# Patient Record
Sex: FEMALE | Race: WHITE
[De-identification: names, ages, dates, MRNs, and addresses within clinical notes are randomized per-mention and may not be internally consistent; named-entity substitution may affect disease eponyms.]

---

## 2017-06-28 ENCOUNTER — HOSPITAL ENCOUNTER (OUTPATIENT)
Dept: HOSPITAL 84 - D.CT | Age: 67
Discharge: HOME | End: 2017-06-28
Attending: FAMILY MEDICINE
Payer: MEDICARE

## 2017-06-28 DIAGNOSIS — R55: Primary | ICD-10-CM

## 2018-01-17 ENCOUNTER — HOSPITAL ENCOUNTER (INPATIENT)
Dept: HOSPITAL 84 - D.MS | Age: 68
LOS: 3 days | Discharge: HOME HEALTH | DRG: 392 | End: 2018-01-20
Attending: FAMILY MEDICINE | Admitting: FAMILY MEDICINE
Payer: MEDICARE

## 2018-01-17 VITALS
BODY MASS INDEX: 23.19 KG/M2 | BODY MASS INDEX: 23.19 KG/M2 | BODY MASS INDEX: 23.19 KG/M2 | HEIGHT: 62 IN | BODY MASS INDEX: 23.19 KG/M2 | WEIGHT: 126 LBS | WEIGHT: 126 LBS | HEIGHT: 62 IN

## 2018-01-17 VITALS — SYSTOLIC BLOOD PRESSURE: 124 MMHG | DIASTOLIC BLOOD PRESSURE: 58 MMHG

## 2018-01-17 DIAGNOSIS — K59.09: ICD-10-CM

## 2018-01-17 DIAGNOSIS — I10: ICD-10-CM

## 2018-01-17 DIAGNOSIS — I48.91: ICD-10-CM

## 2018-01-17 DIAGNOSIS — J45.909: ICD-10-CM

## 2018-01-17 DIAGNOSIS — K57.92: Primary | ICD-10-CM

## 2018-01-17 LAB
ALBUMIN SERPL-MCNC: 3.4 G/DL (ref 3.4–5)
ALP SERPL-CCNC: 101 U/L (ref 46–116)
ALT SERPL-CCNC: 22 U/L (ref 10–68)
AMYLASE SERPL-CCNC: 56 U/L (ref 25–115)
ANION GAP SERPL CALC-SCNC: 14.3 MMOL/L (ref 8–16)
BASOPHILS NFR BLD AUTO: 0.4 % (ref 0–2)
BILIRUB SERPL-MCNC: 0.3 MG/DL (ref 0.2–1.3)
BUN SERPL-MCNC: 14 MG/DL (ref 7–18)
CALCIUM SERPL-MCNC: 9.1 MG/DL (ref 8.5–10.1)
CHLORIDE SERPL-SCNC: 101 MMOL/L (ref 98–107)
CO2 SERPL-SCNC: 27.4 MMOL/L (ref 21–32)
CREAT SERPL-MCNC: 1 MG/DL (ref 0.6–1.3)
EOSINOPHIL NFR BLD: 1.2 % (ref 0–7)
ERYTHROCYTE [DISTWIDTH] IN BLOOD BY AUTOMATED COUNT: 12.7 % (ref 11.5–14.5)
GLOBULIN SER-MCNC: 3.3 G/L
GLUCOSE SERPL-MCNC: 107 MG/DL (ref 74–106)
HCT VFR BLD CALC: 37.7 % (ref 36–48)
HGB BLD-MCNC: 12.7 G/DL (ref 12–16)
IMM GRANULOCYTES NFR BLD: 0.1 % (ref 0–5)
LIPASE SERPL-CCNC: 192 U/L (ref 73–393)
LYMPHOCYTES NFR BLD AUTO: 27.5 % (ref 15–50)
MCH RBC QN AUTO: 31.4 PG (ref 26–34)
MCHC RBC AUTO-ENTMCNC: 33.7 G/DL (ref 31–37)
MCV RBC: 93.3 FL (ref 80–100)
MONOCYTES NFR BLD: 8.3 % (ref 2–11)
NEUTROPHILS NFR BLD AUTO: 62.5 % (ref 40–80)
OSMOLALITY SERPL CALC.SUM OF ELEC: 278 MOSM/KG (ref 275–300)
PLATELET # BLD: 309 10X3/UL (ref 130–400)
PMV BLD AUTO: 10.7 FL (ref 7.4–10.4)
POTASSIUM SERPL-SCNC: 3.7 MMOL/L (ref 3.5–5.1)
PROT SERPL-MCNC: 6.7 G/DL (ref 6.4–8.2)
RBC # BLD AUTO: 4.04 10X6/UL (ref 4–5.4)
SODIUM SERPL-SCNC: 139 MMOL/L (ref 136–145)
WBC # BLD AUTO: 7.3 10X3/UL (ref 4.8–10.8)

## 2018-01-18 VITALS — SYSTOLIC BLOOD PRESSURE: 132 MMHG | DIASTOLIC BLOOD PRESSURE: 63 MMHG

## 2018-01-18 VITALS — SYSTOLIC BLOOD PRESSURE: 122 MMHG | DIASTOLIC BLOOD PRESSURE: 62 MMHG

## 2018-01-18 VITALS — SYSTOLIC BLOOD PRESSURE: 136 MMHG | DIASTOLIC BLOOD PRESSURE: 60 MMHG

## 2018-01-18 VITALS — DIASTOLIC BLOOD PRESSURE: 74 MMHG | SYSTOLIC BLOOD PRESSURE: 137 MMHG

## 2018-01-18 VITALS — DIASTOLIC BLOOD PRESSURE: 65 MMHG | SYSTOLIC BLOOD PRESSURE: 120 MMHG

## 2018-01-18 LAB
APPEARANCE UR: CLEAR
BACTERIA #/AREA URNS HPF: (no result) /HPF
BILIRUB SERPL-MCNC: NEGATIVE MG/DL
COLOR UR: YELLOW
GLUCOSE SERPL-MCNC: NEGATIVE MG/DL
KETONES UR STRIP-MCNC: NEGATIVE MG/DL
NITRITE UR-MCNC: NEGATIVE MG/ML
PH UR STRIP: 5 [PH] (ref 5–6)
PROT UR-MCNC: NEGATIVE MG/DL
RBC #/AREA URNS HPF: (no result) /HPF (ref 0–5)
SP GR UR STRIP: 1.02 (ref 1–1.02)
SQUAMOUS #/AREA URNS HPF: (no result) /HPF (ref 0–5)
UROBILINOGEN UR-MCNC: NORMAL MG/DL
WBC #/AREA URNS HPF: (no result) /HPF (ref 0–5)

## 2018-01-19 VITALS — SYSTOLIC BLOOD PRESSURE: 105 MMHG | DIASTOLIC BLOOD PRESSURE: 72 MMHG

## 2018-01-19 VITALS — SYSTOLIC BLOOD PRESSURE: 137 MMHG | DIASTOLIC BLOOD PRESSURE: 73 MMHG

## 2018-01-19 VITALS — SYSTOLIC BLOOD PRESSURE: 137 MMHG | DIASTOLIC BLOOD PRESSURE: 67 MMHG

## 2018-01-19 VITALS — SYSTOLIC BLOOD PRESSURE: 136 MMHG | DIASTOLIC BLOOD PRESSURE: 63 MMHG

## 2018-01-19 VITALS — DIASTOLIC BLOOD PRESSURE: 74 MMHG | SYSTOLIC BLOOD PRESSURE: 143 MMHG

## 2018-01-20 VITALS — SYSTOLIC BLOOD PRESSURE: 116 MMHG | DIASTOLIC BLOOD PRESSURE: 76 MMHG

## 2018-01-20 VITALS — SYSTOLIC BLOOD PRESSURE: 112 MMHG | DIASTOLIC BLOOD PRESSURE: 58 MMHG

## 2018-01-20 LAB
ANION GAP SERPL CALC-SCNC: 10.9 MMOL/L (ref 8–16)
BASOPHILS NFR BLD AUTO: 0.4 % (ref 0–2)
BUN SERPL-MCNC: 9 MG/DL (ref 7–18)
CALCIUM SERPL-MCNC: 9.2 MG/DL (ref 8.5–10.1)
CHLORIDE SERPL-SCNC: 107 MMOL/L (ref 98–107)
CO2 SERPL-SCNC: 27.7 MMOL/L (ref 21–32)
CREAT SERPL-MCNC: 0.8 MG/DL (ref 0.6–1.3)
EOSINOPHIL NFR BLD: 1.8 % (ref 0–7)
ERYTHROCYTE [DISTWIDTH] IN BLOOD BY AUTOMATED COUNT: 12.5 % (ref 11.5–14.5)
GLUCOSE SERPL-MCNC: 93 MG/DL (ref 74–106)
HCT VFR BLD CALC: 38.5 % (ref 36–48)
HGB BLD-MCNC: 12.7 G/DL (ref 12–16)
IMM GRANULOCYTES NFR BLD: 0.2 % (ref 0–5)
LYMPHOCYTES NFR BLD AUTO: 26.8 % (ref 15–50)
MCH RBC QN AUTO: 30.8 PG (ref 26–34)
MCHC RBC AUTO-ENTMCNC: 33 G/DL (ref 31–37)
MCV RBC: 93.2 FL (ref 80–100)
MONOCYTES NFR BLD: 8 % (ref 2–11)
NEUTROPHILS NFR BLD AUTO: 62.8 % (ref 40–80)
OSMOLALITY SERPL CALC.SUM OF ELEC: 281 MOSM/KG (ref 275–300)
PLATELET # BLD: 277 10X3/UL (ref 130–400)
PMV BLD AUTO: 10.7 FL (ref 7.4–10.4)
POTASSIUM SERPL-SCNC: 3.6 MMOL/L (ref 3.5–5.1)
RBC # BLD AUTO: 4.13 10X6/UL (ref 4–5.4)
SODIUM SERPL-SCNC: 142 MMOL/L (ref 136–145)
WBC # BLD AUTO: 5 10X3/UL (ref 4.8–10.8)

## 2018-02-20 ENCOUNTER — HOSPITAL ENCOUNTER (INPATIENT)
Dept: HOSPITAL 84 - D.SDCHOLD | Age: 68
LOS: 3 days | Discharge: HOME | DRG: 331 | End: 2018-02-23
Attending: SURGERY | Admitting: SURGERY
Payer: MEDICARE

## 2018-02-20 VITALS — SYSTOLIC BLOOD PRESSURE: 108 MMHG | DIASTOLIC BLOOD PRESSURE: 67 MMHG

## 2018-02-20 VITALS — DIASTOLIC BLOOD PRESSURE: 62 MMHG | SYSTOLIC BLOOD PRESSURE: 110 MMHG

## 2018-02-20 VITALS — SYSTOLIC BLOOD PRESSURE: 112 MMHG | DIASTOLIC BLOOD PRESSURE: 59 MMHG

## 2018-02-20 VITALS — DIASTOLIC BLOOD PRESSURE: 59 MMHG | SYSTOLIC BLOOD PRESSURE: 107 MMHG

## 2018-02-20 VITALS — BODY MASS INDEX: 22.5 KG/M2 | BODY MASS INDEX: 22.7 KG/M2

## 2018-02-20 DIAGNOSIS — K57.32: Primary | ICD-10-CM

## 2018-02-20 DIAGNOSIS — J45.909: ICD-10-CM

## 2018-02-20 DIAGNOSIS — I48.91: ICD-10-CM

## 2018-02-20 LAB
ANION GAP SERPL CALC-SCNC: 13.1 MMOL/L (ref 8–16)
BUN SERPL-MCNC: 11 MG/DL (ref 7–18)
CALCIUM SERPL-MCNC: 9.3 MG/DL (ref 8.5–10.1)
CHLORIDE SERPL-SCNC: 102 MMOL/L (ref 98–107)
CO2 SERPL-SCNC: 29.6 MMOL/L (ref 21–32)
CREAT SERPL-MCNC: 0.4 MG/DL (ref 0.6–1.3)
ERYTHROCYTE [DISTWIDTH] IN BLOOD BY AUTOMATED COUNT: 12.9 % (ref 11.5–14.5)
GLUCOSE SERPL-MCNC: 84 MG/DL (ref 74–106)
HCT VFR BLD CALC: 37.8 % (ref 36–48)
HGB BLD-MCNC: 13.1 G/DL (ref 12–16)
LYMPHOCYTES NFR BLD AUTO: 30.6 % (ref 15–50)
MCH RBC QN AUTO: 30.8 PG (ref 26–34)
MCHC RBC AUTO-ENTMCNC: 34.7 G/DL (ref 31–37)
MCV RBC: 88.9 FL (ref 80–100)
NEUTROPHILS NFR BLD AUTO: 60.6 % (ref 40–80)
OSMOLALITY SERPL CALC.SUM OF ELEC: 275 MOSM/KG (ref 275–300)
PLATELET # BLD: 291 10X3/UL (ref 130–400)
PMV BLD AUTO: 11.3 FL (ref 7.4–10.4)
POTASSIUM SERPL-SCNC: 5.7 MMOL/L (ref 3.5–5.1)
RBC # BLD AUTO: 4.25 10X6/UL (ref 4–5.4)
SODIUM SERPL-SCNC: 139 MMOL/L (ref 136–145)
WBC # BLD AUTO: 4.6 10X3/UL (ref 4.8–10.8)

## 2018-02-20 PROCEDURE — 0DBN0ZZ EXCISION OF SIGMOID COLON, OPEN APPROACH: ICD-10-PCS | Performed by: SURGERY

## 2018-02-21 VITALS — SYSTOLIC BLOOD PRESSURE: 115 MMHG | DIASTOLIC BLOOD PRESSURE: 49 MMHG

## 2018-02-21 VITALS — SYSTOLIC BLOOD PRESSURE: 106 MMHG | DIASTOLIC BLOOD PRESSURE: 47 MMHG

## 2018-02-21 VITALS — DIASTOLIC BLOOD PRESSURE: 47 MMHG | SYSTOLIC BLOOD PRESSURE: 102 MMHG

## 2018-02-21 VITALS — DIASTOLIC BLOOD PRESSURE: 50 MMHG | SYSTOLIC BLOOD PRESSURE: 96 MMHG

## 2018-02-21 VITALS — SYSTOLIC BLOOD PRESSURE: 122 MMHG | DIASTOLIC BLOOD PRESSURE: 61 MMHG

## 2018-02-21 VITALS — DIASTOLIC BLOOD PRESSURE: 58 MMHG | SYSTOLIC BLOOD PRESSURE: 114 MMHG

## 2018-02-21 LAB
ANION GAP SERPL CALC-SCNC: 14.7 MMOL/L (ref 8–16)
BASOPHILS NFR BLD AUTO: 0.1 % (ref 0–2)
BUN SERPL-MCNC: 8 MG/DL (ref 7–18)
CALCIUM SERPL-MCNC: 8 MG/DL (ref 8.5–10.1)
CHLORIDE SERPL-SCNC: 103 MMOL/L (ref 98–107)
CO2 SERPL-SCNC: 23.1 MMOL/L (ref 21–32)
CREAT SERPL-MCNC: 0.7 MG/DL (ref 0.6–1.3)
EOSINOPHIL NFR BLD: 0 % (ref 0–7)
ERYTHROCYTE [DISTWIDTH] IN BLOOD BY AUTOMATED COUNT: 12.8 % (ref 11.5–14.5)
GLUCOSE SERPL-MCNC: 98 MG/DL (ref 74–106)
HCT VFR BLD CALC: 36 % (ref 36–48)
HGB BLD-MCNC: 12 G/DL (ref 12–16)
IMM GRANULOCYTES NFR BLD: 0.3 % (ref 0–5)
LYMPHOCYTES NFR BLD AUTO: 8.7 % (ref 15–50)
MCH RBC QN AUTO: 30.7 PG (ref 26–34)
MCHC RBC AUTO-ENTMCNC: 33.3 G/DL (ref 31–37)
MCV RBC: 92.1 FL (ref 80–100)
MONOCYTES NFR BLD: 4.7 % (ref 2–11)
NEUTROPHILS NFR BLD AUTO: 86.2 % (ref 40–80)
OSMOLALITY SERPL CALC.SUM OF ELEC: 271 MOSM/KG (ref 275–300)
PLATELET # BLD: 269 10X3/UL (ref 130–400)
PMV BLD AUTO: 10.9 FL (ref 7.4–10.4)
POTASSIUM SERPL-SCNC: 3.8 MMOL/L (ref 3.5–5.1)
RBC # BLD AUTO: 3.91 10X6/UL (ref 4–5.4)
SODIUM SERPL-SCNC: 137 MMOL/L (ref 136–145)
WBC # BLD AUTO: 10.6 10X3/UL (ref 4.8–10.8)

## 2018-02-22 VITALS — SYSTOLIC BLOOD PRESSURE: 121 MMHG | DIASTOLIC BLOOD PRESSURE: 66 MMHG

## 2018-02-22 VITALS — SYSTOLIC BLOOD PRESSURE: 156 MMHG | DIASTOLIC BLOOD PRESSURE: 75 MMHG

## 2018-02-22 VITALS — SYSTOLIC BLOOD PRESSURE: 126 MMHG | DIASTOLIC BLOOD PRESSURE: 64 MMHG

## 2018-02-22 VITALS — DIASTOLIC BLOOD PRESSURE: 50 MMHG | SYSTOLIC BLOOD PRESSURE: 121 MMHG

## 2018-02-22 VITALS — SYSTOLIC BLOOD PRESSURE: 144 MMHG | DIASTOLIC BLOOD PRESSURE: 93 MMHG

## 2018-02-22 VITALS — SYSTOLIC BLOOD PRESSURE: 113 MMHG | DIASTOLIC BLOOD PRESSURE: 53 MMHG

## 2018-02-22 LAB
ANION GAP SERPL CALC-SCNC: 18.4 MMOL/L (ref 8–16)
BASOPHILS NFR BLD AUTO: 0.3 % (ref 0–2)
BUN SERPL-MCNC: 6 MG/DL (ref 7–18)
CALCIUM SERPL-MCNC: 8.2 MG/DL (ref 8.5–10.1)
CHLORIDE SERPL-SCNC: 103 MMOL/L (ref 98–107)
CO2 SERPL-SCNC: 20.2 MMOL/L (ref 21–32)
CREAT SERPL-MCNC: 0.6 MG/DL (ref 0.6–1.3)
EOSINOPHIL NFR BLD: 1.2 % (ref 0–7)
ERYTHROCYTE [DISTWIDTH] IN BLOOD BY AUTOMATED COUNT: 13.1 % (ref 11.5–14.5)
GLUCOSE SERPL-MCNC: 63 MG/DL (ref 74–106)
HCT VFR BLD CALC: 33.3 % (ref 36–48)
HGB BLD-MCNC: 10.8 G/DL (ref 12–16)
IMM GRANULOCYTES NFR BLD: 0.1 % (ref 0–5)
LYMPHOCYTES NFR BLD AUTO: 25.2 % (ref 15–50)
MCH RBC QN AUTO: 30.5 PG (ref 26–34)
MCHC RBC AUTO-ENTMCNC: 32.4 G/DL (ref 31–37)
MCV RBC: 94.1 FL (ref 80–100)
MONOCYTES NFR BLD: 6.9 % (ref 2–11)
NEUTROPHILS NFR BLD AUTO: 66.3 % (ref 40–80)
OSMOLALITY SERPL CALC.SUM OF ELEC: 271 MOSM/KG (ref 275–300)
PLATELET # BLD: 229 10X3/UL (ref 130–400)
PMV BLD AUTO: 10.9 FL (ref 7.4–10.4)
POTASSIUM SERPL-SCNC: 3.6 MMOL/L (ref 3.5–5.1)
RBC # BLD AUTO: 3.54 10X6/UL (ref 4–5.4)
SODIUM SERPL-SCNC: 138 MMOL/L (ref 136–145)
WBC # BLD AUTO: 6.8 10X3/UL (ref 4.8–10.8)

## 2018-02-23 VITALS — SYSTOLIC BLOOD PRESSURE: 126 MMHG | DIASTOLIC BLOOD PRESSURE: 53 MMHG

## 2018-02-23 VITALS — DIASTOLIC BLOOD PRESSURE: 82 MMHG | SYSTOLIC BLOOD PRESSURE: 129 MMHG

## 2018-02-23 VITALS — DIASTOLIC BLOOD PRESSURE: 74 MMHG | SYSTOLIC BLOOD PRESSURE: 164 MMHG

## 2018-02-23 LAB
ANION GAP SERPL CALC-SCNC: 10.8 MMOL/L (ref 8–16)
BASOPHILS NFR BLD AUTO: 0.2 % (ref 0–2)
BUN SERPL-MCNC: 4 MG/DL (ref 7–18)
CALCIUM SERPL-MCNC: 8.8 MG/DL (ref 8.5–10.1)
CHLORIDE SERPL-SCNC: 101 MMOL/L (ref 98–107)
CO2 SERPL-SCNC: 29.5 MMOL/L (ref 21–32)
CREAT SERPL-MCNC: 0.5 MG/DL (ref 0.6–1.3)
EOSINOPHIL NFR BLD: 2.3 % (ref 0–7)
ERYTHROCYTE [DISTWIDTH] IN BLOOD BY AUTOMATED COUNT: 12.5 % (ref 11.5–14.5)
GLUCOSE SERPL-MCNC: 80 MG/DL (ref 74–106)
HCT VFR BLD CALC: 35.3 % (ref 36–48)
HGB BLD-MCNC: 11.9 G/DL (ref 12–16)
IMM GRANULOCYTES NFR BLD: 0.2 % (ref 0–5)
LYMPHOCYTES NFR BLD AUTO: 20.2 % (ref 15–50)
MCH RBC QN AUTO: 30.6 PG (ref 26–34)
MCHC RBC AUTO-ENTMCNC: 33.7 G/DL (ref 31–37)
MCV RBC: 90.7 FL (ref 80–100)
MONOCYTES NFR BLD: 7.8 % (ref 2–11)
NEUTROPHILS NFR BLD AUTO: 69.3 % (ref 40–80)
OSMOLALITY SERPL CALC.SUM OF ELEC: 271 MOSM/KG (ref 275–300)
PLATELET # BLD: 265 10X3/UL (ref 130–400)
PMV BLD AUTO: 10.5 FL (ref 7.4–10.4)
POTASSIUM SERPL-SCNC: 3.3 MMOL/L (ref 3.5–5.1)
RBC # BLD AUTO: 3.89 10X6/UL (ref 4–5.4)
SODIUM SERPL-SCNC: 138 MMOL/L (ref 136–145)
WBC # BLD AUTO: 5.2 10X3/UL (ref 4.8–10.8)

## 2018-03-21 ENCOUNTER — HOSPITAL ENCOUNTER (OUTPATIENT)
Dept: HOSPITAL 84 - D.CT | Age: 68
Discharge: HOME | End: 2018-03-21
Attending: FAMILY MEDICINE
Payer: MEDICARE

## 2018-03-21 VITALS — BODY MASS INDEX: 22.5 KG/M2

## 2018-03-21 DIAGNOSIS — K57.90: Primary | ICD-10-CM

## 2019-04-11 ENCOUNTER — HOSPITAL ENCOUNTER (EMERGENCY)
Dept: HOSPITAL 84 - D.ER | Age: 69
Discharge: HOME | End: 2019-04-11
Payer: MEDICARE

## 2019-04-11 VITALS — DIASTOLIC BLOOD PRESSURE: 76 MMHG | SYSTOLIC BLOOD PRESSURE: 130 MMHG

## 2019-04-11 VITALS — HEIGHT: 62 IN | BODY MASS INDEX: 23.97 KG/M2 | WEIGHT: 130.27 LBS

## 2019-04-11 DIAGNOSIS — R07.89: Primary | ICD-10-CM

## 2019-04-11 LAB
ALBUMIN SERPL-MCNC: 3.9 G/DL (ref 3.4–5)
ALP SERPL-CCNC: 100 U/L (ref 46–116)
ALT SERPL-CCNC: 26 U/L (ref 10–68)
ANION GAP SERPL CALC-SCNC: 14.8 MMOL/L (ref 8–16)
APTT BLD: 34.9 SECONDS (ref 22.8–39.4)
BASOPHILS NFR BLD AUTO: 0.6 % (ref 0–2)
BILIRUB SERPL-MCNC: 0.54 MG/DL (ref 0.2–1.3)
BUN SERPL-MCNC: 12 MG/DL (ref 7–18)
CALCIUM SERPL-MCNC: 9.2 MG/DL (ref 8.5–10.1)
CHLORIDE SERPL-SCNC: 101 MMOL/L (ref 98–107)
CK MB SERPL-MCNC: 0.8 U/L (ref 0–3.6)
CK SERPL-CCNC: 69 UL (ref 21–215)
CO2 SERPL-SCNC: 26.5 MMOL/L (ref 21–32)
CREAT SERPL-MCNC: 0.9 MG/DL (ref 0.6–1.3)
EOSINOPHIL NFR BLD: 0.9 % (ref 0–7)
ERYTHROCYTE [DISTWIDTH] IN BLOOD BY AUTOMATED COUNT: 12.6 % (ref 11.5–14.5)
GLOBULIN SER-MCNC: 3.9 G/L
GLUCOSE SERPL-MCNC: 117 MG/DL (ref 74–106)
HCT VFR BLD CALC: 42.3 % (ref 36–48)
HGB BLD-MCNC: 14.6 G/DL (ref 12–16)
IMM GRANULOCYTES NFR BLD: 0.2 % (ref 0–5)
INR PPP: 1.16 (ref 0.85–1.17)
LYMPHOCYTES NFR BLD AUTO: 23.4 % (ref 15–50)
MAGNESIUM SERPL-MCNC: 2.1 MG/DL (ref 1.8–2.4)
MCH RBC QN AUTO: 30.8 PG (ref 26–34)
MCHC RBC AUTO-ENTMCNC: 34.5 G/DL (ref 31–37)
MCV RBC: 89.2 FL (ref 80–100)
MONOCYTES NFR BLD: 6.1 % (ref 2–11)
NEUTROPHILS NFR BLD AUTO: 68.8 % (ref 40–80)
OSMOLALITY SERPL CALC.SUM OF ELEC: 278 MOSM/KG (ref 275–300)
PLATELET # BLD: 276 10X3/UL (ref 130–400)
PMV BLD AUTO: 11.2 FL (ref 7.4–10.4)
POTASSIUM SERPL-SCNC: 3.3 MMOL/L (ref 3.5–5.1)
PROT SERPL-MCNC: 7.8 G/DL (ref 6.4–8.2)
PROTHROMBIN TIME: 14.2 SECONDS (ref 11.6–15)
RBC # BLD AUTO: 4.74 10X6/UL (ref 4–5.4)
SODIUM SERPL-SCNC: 139 MMOL/L (ref 136–145)
TROPONIN I SERPL-MCNC: < 0.017 NG/ML (ref 0–0.06)
WBC # BLD AUTO: 6.4 10X3/UL (ref 4.8–10.8)

## 2021-06-02 ENCOUNTER — HOSPITAL ENCOUNTER (INPATIENT)
Dept: HOSPITAL 84 - D.ER | Age: 71
LOS: 1 days | Discharge: HOME | DRG: 69 | End: 2021-06-03
Attending: FAMILY MEDICINE | Admitting: FAMILY MEDICINE
Payer: MEDICARE

## 2021-06-02 VITALS — WEIGHT: 132.28 LBS | HEIGHT: 62 IN | BODY MASS INDEX: 24.34 KG/M2

## 2021-06-02 DIAGNOSIS — M54.12: ICD-10-CM

## 2021-06-02 DIAGNOSIS — R47.1: ICD-10-CM

## 2021-06-02 DIAGNOSIS — I48.91: ICD-10-CM

## 2021-06-02 DIAGNOSIS — J45.909: ICD-10-CM

## 2021-06-02 DIAGNOSIS — Z79.01: ICD-10-CM

## 2021-06-02 DIAGNOSIS — E03.9: ICD-10-CM

## 2021-06-02 DIAGNOSIS — G45.8: Primary | ICD-10-CM

## 2021-06-02 DIAGNOSIS — K59.09: ICD-10-CM

## 2021-06-02 DIAGNOSIS — R29.810: ICD-10-CM

## 2021-06-02 DIAGNOSIS — G89.29: ICD-10-CM

## 2021-06-02 DIAGNOSIS — E78.5: ICD-10-CM

## 2021-06-02 LAB
ALBUMIN SERPL-MCNC: 3.9 G/DL (ref 3.4–5)
ALP SERPL-CCNC: 61 U/L (ref 30–120)
ALT SERPL-CCNC: 26 U/L (ref 10–68)
ANION GAP SERPL CALC-SCNC: 12.9 MMOL/L (ref 8–16)
APTT BLD: 28.8 SECONDS (ref 22.8–39.4)
BASOPHILS NFR BLD AUTO: 0.2 % (ref 0–2)
BILIRUB SERPL-MCNC: 0.35 MG/DL (ref 0.2–1.3)
BUN SERPL-MCNC: 14 MG/DL (ref 7–18)
CALCIUM SERPL-MCNC: 9.3 MG/DL (ref 8.5–10.1)
CHLORIDE SERPL-SCNC: 102 MMOL/L (ref 98–107)
CK MB SERPL-MCNC: 1.7 U/L (ref 0–3.6)
CK SERPL-CCNC: 217 UL (ref 21–215)
CO2 SERPL-SCNC: 27.9 MMOL/L (ref 21–32)
CREAT SERPL-MCNC: 1 MG/DL (ref 0.6–1.3)
EOSINOPHIL NFR BLD: 0 % (ref 0–7)
ERYTHROCYTE [DISTWIDTH] IN BLOOD BY AUTOMATED COUNT: 13.7 % (ref 11.5–14.5)
GLOBULIN SER-MCNC: 3.6 G/L
GLUCOSE SERPL-MCNC: 263 MG/DL (ref 74–106)
HCT VFR BLD CALC: 40.9 % (ref 36–48)
HGB BLD-MCNC: 13.7 G/DL (ref 12–16)
INR PPP: 1.12 (ref 0.85–1.17)
LYMPHOCYTES # BLD: 0.6 10X3/UL (ref 1.18–3.74)
LYMPHOCYTES NFR BLD AUTO: 5.5 % (ref 15–50)
MAGNESIUM SERPL-MCNC: 2 MG/DL (ref 1.8–2.4)
MCH RBC QN AUTO: 31 PG (ref 26–34)
MCHC RBC AUTO-ENTMCNC: 33.5 G/DL (ref 31–37)
MCV RBC: 92.6 FL (ref 80–100)
MONOCYTES NFR BLD: 3.7 % (ref 2–11)
NEUTROPHILS # BLD: 10.5 10X3/UL (ref 1.56–6.13)
NEUTROPHILS NFR BLD AUTO: 90.6 % (ref 40–80)
OSMOLALITY SERPL CALC.SUM OF ELEC: 287 MOSM/KG (ref 275–300)
PLATELET # BLD: 253 10X3/UL (ref 130–400)
PMV BLD AUTO: 9.2 FL (ref 7.4–10.4)
POTASSIUM SERPL-SCNC: 3.8 MMOL/L (ref 3.5–5.1)
PROT SERPL-MCNC: 7.5 G/DL (ref 6.4–8.2)
PROTHROMBIN TIME: 13.3 SECONDS (ref 11.6–15)
RBC # BLD AUTO: 4.42 10X6/UL (ref 4–5.4)
SODIUM SERPL-SCNC: 139 MMOL/L (ref 136–145)
TROPONIN I SERPL-MCNC: < 0.017 NG/ML (ref 0–0.06)
TSH SERPL-ACNC: 0.58 UIU/ML (ref 0.36–3.74)
WBC # BLD AUTO: 11.6 10X3/UL (ref 4.8–10.8)

## 2021-06-02 NOTE — NUR
RECEIVED PT FROM ER VIA WHEELCHAIR, A&O X4. PIV TO RIGHT AC PATENT AND SL, NO
REDNESS OR SWELLING. PT ABLE TO AMBULATE AD [place]. EDUCATED PT ON CL AND NEEDS,
VERBALIZED UNDERSTANDING. BED LOW, CL IN REACH.

## 2021-06-03 VITALS — SYSTOLIC BLOOD PRESSURE: 149 MMHG | DIASTOLIC BLOOD PRESSURE: 73 MMHG

## 2021-06-03 VITALS — DIASTOLIC BLOOD PRESSURE: 82 MMHG | SYSTOLIC BLOOD PRESSURE: 156 MMHG

## 2021-06-03 VITALS — DIASTOLIC BLOOD PRESSURE: 88 MMHG | SYSTOLIC BLOOD PRESSURE: 144 MMHG

## 2021-06-03 VITALS — DIASTOLIC BLOOD PRESSURE: 62 MMHG | SYSTOLIC BLOOD PRESSURE: 154 MMHG

## 2021-06-03 LAB
ALBUMIN SERPL-MCNC: 3.5 G/DL (ref 3.4–5)
ALP SERPL-CCNC: 57 U/L (ref 30–120)
ALT SERPL-CCNC: 24 U/L (ref 10–68)
ANION GAP SERPL CALC-SCNC: 10 MMOL/L (ref 8–16)
BASOPHILS NFR BLD AUTO: 0.1 % (ref 0–2)
BILIRUB SERPL-MCNC: 0.35 MG/DL (ref 0.2–1.3)
BUN SERPL-MCNC: 15 MG/DL (ref 7–18)
CALCIUM SERPL-MCNC: 8.9 MG/DL (ref 8.5–10.1)
CHLORIDE SERPL-SCNC: 105 MMOL/L (ref 98–107)
CO2 SERPL-SCNC: 30.4 MMOL/L (ref 21–32)
CREAT SERPL-MCNC: 0.7 MG/DL (ref 0.6–1.3)
EOSINOPHIL NFR BLD: 0 % (ref 0–7)
ERYTHROCYTE [DISTWIDTH] IN BLOOD BY AUTOMATED COUNT: 13.7 % (ref 11.5–14.5)
GLOBULIN SER-MCNC: 3.3 G/L
GLUCOSE SERPL-MCNC: 134 MG/DL (ref 74–106)
HCT VFR BLD CALC: 38.7 % (ref 36–48)
HGB BLD-MCNC: 12.9 G/DL (ref 12–16)
LYMPHOCYTES # BLD: 1.1 10X3/UL (ref 1.18–3.74)
LYMPHOCYTES NFR BLD AUTO: 9.6 % (ref 15–50)
MAGNESIUM SERPL-MCNC: 2.6 MG/DL (ref 1.8–2.4)
MCH RBC QN AUTO: 30.7 PG (ref 26–34)
MCHC RBC AUTO-ENTMCNC: 33.3 G/DL (ref 31–37)
MCV RBC: 92.2 FL (ref 80–100)
MONOCYTES NFR BLD: 4.6 % (ref 2–11)
NEUTROPHILS # BLD: 9.6 10X3/UL (ref 1.56–6.13)
NEUTROPHILS NFR BLD AUTO: 85.7 % (ref 40–80)
OSMOLALITY SERPL CALC.SUM OF ELEC: 283 MOSM/KG (ref 275–300)
PLATELET # BLD: 269 10X3/UL (ref 130–400)
PMV BLD AUTO: 9.8 FL (ref 7.4–10.4)
POTASSIUM SERPL-SCNC: 4.4 MMOL/L (ref 3.5–5.1)
PROT SERPL-MCNC: 6.8 G/DL (ref 6.4–8.2)
RBC # BLD AUTO: 4.2 10X6/UL (ref 4–5.4)
SODIUM SERPL-SCNC: 141 MMOL/L (ref 136–145)
TROPONIN I SERPL-MCNC: < 0.017 NG/ML (ref 0–0.06)
WBC # BLD AUTO: 11.2 10X3/UL (ref 4.8–10.8)

## 2021-06-03 NOTE — NUR
URINE CUP IN PATIENT'S ROOM FOR PATIENT TO COLLECT SAMPLE. EDUCATION PROVIDED
TO PATIENT. VERBALIZED UNDERSTANDING.

## 2021-06-03 NOTE — NUR
PATIENT OUT AT DESK ANGRY WITH STAFF D/T HASN'T BEEN ABLE TO SEE A DOCTOR
"SINCE SHE'S BEEN HERE." SPOKE WITH PATIENT IN ROOM AT BEDSIDE. hospitals NEEDS
TO SEE DOCTOR RIGHT NOW AND SOMEONE TO GO OVER TEST RESULTS. STATES "I HAVE
ONLY SEEN ONE RAMESH AND HE WAS AN INTEREM" IN REFERENCE TO [facility] [employer] [facility] FOR
DR SAPP, WHO SPOKE WITH PATIENT IN ROOM THIS MORNING AT LENGTH. CALLED [facility] TO MAKE AWARE OF PATIENT'S ANGER AND CONCERNS. hospitals IS TRYING TO
FINISH UP DOWNSTAIRS AND COME TO FLOOR NOW AND WILL SEE PATIENT FIRST THING.
ORDER ALSO GIVEN FOR UDS.

## 2021-06-03 NOTE — NUR
DC EDUCATION PROVIDED BOTH WRITTEN AND VERBAL. VERBALIZED UNDERSTANDING.
DENIES FURTHER QUESTIONS. IV REMOVED FROM RIGHT AC WITH TIP INTACT. WAITING
PICKUP.

## 2021-06-03 NOTE — NUR
ALERT AND ORIENTED. ASSESSMENT COMPLETE. DENIES NEEDS. BED LOW. CALL JADE AND
PERSONAL ITEMS IN REACH. WILL CONTINUE TO MONITOR.

## 2021-06-23 ENCOUNTER — HOSPITAL ENCOUNTER (OUTPATIENT)
Dept: HOSPITAL 84 - D.MAMMO | Age: 71
Discharge: HOME | End: 2021-06-23
Attending: FAMILY MEDICINE
Payer: MEDICARE

## 2021-06-23 VITALS — BODY MASS INDEX: 24.2 KG/M2

## 2021-06-23 DIAGNOSIS — Z12.31: Primary | ICD-10-CM
